# Patient Record
(demographics unavailable — no encounter records)

---

## 2024-11-12 NOTE — PHYSICAL EXAM
[FreeTextEntry1] :  Mental status: Awake, alert and oriented x3.  Recent and remote memory intact.  Naming, repetition and comprehension intact.  Attention/concentration intact.  No dysarthria, no aphasia.  Fund of knowledge appropriate.  Cranial nerves: Pupils equally round and reactive to light, visual fields full, no nystagmus, extraocular muscles intact, V1 through V3 intact bilaterally and symmetric, face symmetric, hearing intact to finger rub, palate elevation symmetric, tongue was midline.  Motor:  MRC grading 5/5 b/l UE/LE.   strength 5/5.  Normal tone and bulk.  No abnormal movements.  Sensation: decreased sensation exam to light touch, pinprick, temperature, and vibration in LE, worse at the ankle region compared to patellar region .  Coordination: No dysmetria on finger-to-nose and heel-to-shin.  No dysdiadokinesia.  Reflexes: 2+ in bilateral UE. 2+ in RLE. 3+ in LLE  Gait: Narrow and steady. No ataxia.  Romberg negative

## 2024-11-12 NOTE — HISTORY OF PRESENT ILLNESS
[FreeTextEntry1] : Mr. 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 Patient endorses he gets very bad neck pain associated with headaches in the back of his head which can sometimes be so severe he has intermittent facial numbness. He follows with pain management and orthopedics who has been managing his pain for years. He has been on Vicodin which is managed by pain management. He was also just started on baclofen but is unsure of the dosage.  He endorses he has been having b/l LE numbness and decreased sensation, worsened at the ankles and feet.  denies feeling off balances or falls.   MRI C- spine w/o (performed at [facility]) - Central C3-C4 disc herniation indenting the anterior thecal sac.  -Concentric bulging disc at C5-C6, and C6-C7 levels with no other sites of cervical disc herniation. no cervical spinal stenosis. no cervical cord compression.  MRI L-spine w/o (performed at [facility]) -L3-4 moderate central stenosis due to posterior element disease. Moderate to severe bilateral foraminal stenosis due to combination of facet disease and disc osteophyte complex(es), worse on the left.

## 2024-11-12 NOTE — ASSESSMENT
[FreeTextEntry1] : Mr. 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 After reviewing patient's imaging and seeing his significant spinal disease as well as his PE which showed asymmetry reflexes in the b/l LE and sensory deficits, his headaches are likely due to the herniated disc in his C-spine and B/L LE sensory deficits in likely related to his lumbar spinal stenosis, but due to the new onset of headaches and his age being > 50 years old, would like to order MR head to r/o any central causes.   Plan: MRI Head w/o  c/w baclofen as prescribed by pain management  Neurosurgery referral  EMG/NCS of b/l LE PT referral  RTC 3 months

## 2025-02-04 NOTE — PHYSICAL EXAM
[FreeTextEntry1] : Mental status: Awake, alert and oriented x3. Recent and remote memory intact. Naming, repetition and comprehension intact. Attention/concentration intact. No dysarthria, no aphasia. Fund of knowledge appropriate.  Cranial nerves: Pupils equally round and reactive to light, visual fields full, no nystagmus, extraocular muscles intact, V1 through V3 intact bilaterally and symmetric, face symmetric, hearing intact to finger rub, palate elevation symmetric, tongue was midline.  Motor: MRC grading 5/5 b/l UE/LE.  strength 5/5. Normal tone and bulk. No abnormal movements.  Sensation: intact to light touch, pinprick, temperature, and vibration  Coordination: No dysmetria on finger-to-nose and heel-to-shin. No dysdiadokinesia.  Reflexes: 2+ in bilateral UE. 2+ in RLE. 2+ in LLE  Gait: Narrow and steady. No ataxia. Romberg negative.

## 2025-02-04 NOTE — ASSESSMENT
[FreeTextEntry1] : Mr. 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 Symptoms of Cervical spine disease has improved with PT. recommendations to continue PT for C-spine and start PT for lumbar spine disease  Plan: c/w PT, new referral given  RTC 6 MONTHS

## 2025-07-01 NOTE — ASSESSMENT
[FreeTextEntry1] : Patient is a right middle finger tenosynovitis.  This might be related to a cat bite.  Concerned about the flexor tendons.  Flexor tenosynovitis MRI is indicated for further evaluation.  Will contact me back after the MRIs done.

## 2025-07-01 NOTE — PHYSICAL EXAM
[de-identified] : He can see punctate areas where the wounds have healed over.  There is incomplete flexion of the finger.  Active swelling that is present.  No triggering.  There is normal capillary refill.  Heme ecchymoses or abrasions.

## 2025-07-01 NOTE — HISTORY OF PRESENT ILLNESS
[de-identified] : 63-year-old male was bit by a cat several months ago.  Developed pain discomfort swelling and infection in the right middle finger at the level of the pulp.  Treated with antibiotics.  He did get better but now he has continued and increasing pain discomfort in the middle finger.  And stiffness in the middle finger.